# Patient Record
Sex: FEMALE | Race: WHITE
[De-identification: names, ages, dates, MRNs, and addresses within clinical notes are randomized per-mention and may not be internally consistent; named-entity substitution may affect disease eponyms.]

---

## 2018-07-08 ENCOUNTER — HOSPITAL ENCOUNTER (EMERGENCY)
Dept: HOSPITAL 1 - ED | Age: 28
Discharge: HOME | End: 2018-07-08
Payer: COMMERCIAL

## 2018-07-08 VITALS — BODY MASS INDEX: 28.27 KG/M2 | WEIGHT: 143.98 LBS | HEIGHT: 60 IN

## 2018-07-08 VITALS — SYSTOLIC BLOOD PRESSURE: 98 MMHG | DIASTOLIC BLOOD PRESSURE: 60 MMHG

## 2018-07-08 DIAGNOSIS — F17.210: ICD-10-CM

## 2018-07-08 DIAGNOSIS — E10.65: ICD-10-CM

## 2018-07-08 DIAGNOSIS — M79.7: ICD-10-CM

## 2018-07-08 DIAGNOSIS — O20.0: Primary | ICD-10-CM

## 2018-07-08 DIAGNOSIS — Z71.6: ICD-10-CM

## 2018-07-08 LAB
ALBUMIN SERPL-MCNC: 3.4 G/DL (ref 3.4–5)
ALP SERPL-CCNC: 89 U/L (ref 46–116)
ALT SERPL-CCNC: 16 U/L (ref 14–59)
AMYLASE SERPL-CCNC: 39 U/L (ref 25–115)
AST SERPL-CCNC: 9 U/L (ref 15–37)
BASOPHILS NFR BLD: 0.3 % (ref 0–2)
BILIRUB SERPL-MCNC: 0.31 MG/DL (ref 0.2–1)
BUN SERPL-MCNC: 13 MG/DL (ref 7–18)
CALCIUM SERPL-MCNC: 8.6 MG/DL (ref 8.5–10.1)
CHLORIDE SERPL-SCNC: 101 MMOL/L (ref 98–107)
CO2 SERPL-SCNC: 25.5 MMOL/L (ref 21–32)
CREAT SERPL-MCNC: 0.7 MG/DL (ref 0.6–1)
ERYTHROCYTE [DISTWIDTH] IN BLOOD BY AUTOMATED COUNT: 16.5 % (ref 11.5–14.5)
GFR SERPLBLD BASED ON 1.73 SQ M-ARVRAT: > 60 ML/MIN
GLUCOSE SERPL-MCNC: 296 MG/DL (ref 74–106)
LIPASE SERPL-CCNC: 84 IU/L (ref 73–393)
MICROSCOPIC UR-IMP: YES
PLATELET # BLD: 161 X10^3MCL (ref 130–400)
POTASSIUM SERPL-SCNC: 3.6 MMOL/L (ref 3.5–5.1)
PROT SERPL-MCNC: 7 G/DL (ref 6.4–8.2)
RBC # UR STRIP.AUTO: (no result) /UL
SODIUM SERPL-SCNC: 138 MMOL/L (ref 136–145)
UA SPECIFIC GRAVITY: 1.02 (ref 1–1.03)

## 2021-05-13 ENCOUNTER — HOSPITAL ENCOUNTER (INPATIENT)
Dept: HOSPITAL 26 - MED | Age: 31
LOS: 1 days | Discharge: HOME | DRG: 637 | End: 2021-05-14
Attending: STUDENT IN AN ORGANIZED HEALTH CARE EDUCATION/TRAINING PROGRAM | Admitting: STUDENT IN AN ORGANIZED HEALTH CARE EDUCATION/TRAINING PROGRAM
Payer: SELF-PAY

## 2021-05-13 VITALS — BODY MASS INDEX: 26.58 KG/M2 | WEIGHT: 135.37 LBS | HEIGHT: 60 IN

## 2021-05-13 VITALS — SYSTOLIC BLOOD PRESSURE: 92 MMHG | DIASTOLIC BLOOD PRESSURE: 52 MMHG

## 2021-05-13 VITALS — DIASTOLIC BLOOD PRESSURE: 84 MMHG | SYSTOLIC BLOOD PRESSURE: 121 MMHG

## 2021-05-13 DIAGNOSIS — Z91.013: ICD-10-CM

## 2021-05-13 DIAGNOSIS — E87.6: ICD-10-CM

## 2021-05-13 DIAGNOSIS — R11.10: ICD-10-CM

## 2021-05-13 DIAGNOSIS — Z79.4: ICD-10-CM

## 2021-05-13 DIAGNOSIS — Z88.5: ICD-10-CM

## 2021-05-13 DIAGNOSIS — G93.41: ICD-10-CM

## 2021-05-13 DIAGNOSIS — E10.65: Primary | ICD-10-CM

## 2021-05-13 DIAGNOSIS — F12.90: ICD-10-CM

## 2021-05-13 DIAGNOSIS — M79.7: ICD-10-CM

## 2021-05-13 DIAGNOSIS — R65.10: ICD-10-CM

## 2021-05-13 DIAGNOSIS — E87.1: ICD-10-CM

## 2021-05-13 DIAGNOSIS — Z20.822: ICD-10-CM

## 2021-05-13 LAB
ALBUMIN FLD-MCNC: 3.2 G/DL (ref 3.4–5)
ALBUMIN FLD-MCNC: 4.3 G/DL (ref 3.4–5)
AMYLASE SERPL-CCNC: 35 U/L (ref 25–115)
ANION GAP SERPL CALCULATED.3IONS-SCNC: 11.3 MMOL/L (ref 8–16)
ANION GAP SERPL CALCULATED.3IONS-SCNC: 12.7 MMOL/L (ref 8–16)
ANION GAP SERPL CALCULATED.3IONS-SCNC: 14.3 MMOL/L (ref 8–16)
APPEARANCE UR: (no result)
AST SERPL-CCNC: 10 U/L (ref 15–37)
AST SERPL-CCNC: 15 U/L (ref 15–37)
BARBITURATES UR QL SCN: NEGATIVE NG/ML
BASOPHILS # BLD AUTO: 0.1 K/UL (ref 0–0.22)
BASOPHILS NFR BLD AUTO: 0.5 % (ref 0–2)
BENZODIAZ UR QL SCN: NEGATIVE NG/ML
BILIRUB DIRECT SERPL-MCNC: 0.2 MG/DL (ref 0–0.3)
BILIRUB SERPL-MCNC: 0.4 MG/DL (ref 0–1)
BILIRUB SERPL-MCNC: 1.1 MG/DL (ref 0–1)
BILIRUB UR QL STRIP: NEGATIVE
BUN SERPL-MCNC: 14 MG/DL (ref 7–18)
BUN SERPL-MCNC: 15 MG/DL (ref 7–18)
BUN SERPL-MCNC: 17 MG/DL (ref 7–18)
BZE UR QL SCN: NEGATIVE NG/ML
CANNABINOIDS UR QL SCN: POSITIVE NG/ML
CHLORIDE SERPL-SCNC: 108 MMOL/L (ref 98–107)
CHLORIDE SERPL-SCNC: 110 MMOL/L (ref 98–107)
CHLORIDE SERPL-SCNC: 98 MMOL/L (ref 98–107)
CHOLEST/HDLC SERPL: 3.2 {RATIO} (ref 1–4.5)
CO2 SERPL-SCNC: 24.2 MMOL/L (ref 21–32)
CO2 SERPL-SCNC: 26.2 MMOL/L (ref 21–32)
CO2 SERPL-SCNC: 26.5 MMOL/L (ref 21–32)
COLOR UR: YELLOW
CREAT SERPL-MCNC: 0.7 MG/DL (ref 0.6–1.3)
CREAT SERPL-MCNC: 0.7 MG/DL (ref 0.6–1.3)
CREAT SERPL-MCNC: 1 MG/DL (ref 0.6–1.3)
EOSINOPHIL # BLD AUTO: 0 K/UL (ref 0–0.4)
EOSINOPHIL NFR BLD AUTO: 0.1 % (ref 0–4)
ERYTHROCYTE [DISTWIDTH] IN BLOOD BY AUTOMATED COUNT: 12.7 % (ref 11.6–13.7)
GFR SERPL CREATININE-BSD FRML MDRD: 127 ML/MIN (ref 90–?)
GFR SERPL CREATININE-BSD FRML MDRD: 127 ML/MIN (ref 90–?)
GFR SERPL CREATININE-BSD FRML MDRD: 84 ML/MIN (ref 90–?)
GLUCOSE SERPL-MCNC: 181 MG/DL (ref 74–106)
GLUCOSE SERPL-MCNC: 254 MG/DL (ref 74–106)
GLUCOSE SERPL-MCNC: 411 MG/DL (ref 74–106)
GLUCOSE UR STRIP-MCNC: (no result) MG/DL
HCT VFR BLD AUTO: 40.2 % (ref 36–48)
HDLC SERPL-MCNC: 44 MG/DL (ref 40–60)
HGB BLD-MCNC: 13.7 G/DL (ref 12–16)
HGB UR QL STRIP: NEGATIVE
LDLC SERPL CALC-MCNC: 79 MG/DL (ref 60–100)
LEUKOCYTE ESTERASE UR QL STRIP: NEGATIVE
LIPASE SERPL-CCNC: 39 U/L (ref 73–393)
LIPASE SERPL-CCNC: 68 U/L (ref 73–393)
LYMPHOCYTES # BLD AUTO: 1.8 K/UL (ref 2.5–16.5)
LYMPHOCYTES NFR BLD AUTO: 13.1 % (ref 20.5–51.1)
MAGNESIUM SERPL-MCNC: 1.6 MG/DL (ref 1.8–2.4)
MCH RBC QN AUTO: 29 PG (ref 27–31)
MCHC RBC AUTO-ENTMCNC: 34 G/DL (ref 33–37)
MCV RBC AUTO: 85 FL (ref 80–94)
MONOCYTES # BLD AUTO: 0.5 K/UL (ref 0.8–1)
MONOCYTES NFR BLD AUTO: 3.4 % (ref 1.7–9.3)
NEUTROPHILS # BLD AUTO: 11.1 K/UL (ref 1.8–7.7)
NEUTROPHILS NFR BLD AUTO: 82.9 % (ref 42.2–75.2)
NITRITE UR QL STRIP: NEGATIVE
OPIATES UR QL SCN: POSITIVE NG/ML
PCP UR QL SCN: NEGATIVE NG/ML
PH UR STRIP: 7 [PH] (ref 5–9)
PHOSPHATE SERPL-MCNC: 2.3 MG/DL (ref 2.5–4.9)
PLATELET # BLD AUTO: 237 K/UL (ref 140–450)
POTASSIUM SERPL-SCNC: 3.2 MMOL/L (ref 3.5–5.1)
POTASSIUM SERPL-SCNC: 3.5 MMOL/L (ref 3.5–5.1)
POTASSIUM SERPL-SCNC: 3.5 MMOL/L (ref 3.5–5.1)
PROTHROMBIN TIME: 10.6 SECS (ref 10.8–13.4)
RBC # BLD AUTO: 4.73 MIL/UL (ref 4.2–5.4)
RBC #/AREA URNS HPF: (no result) /HPF (ref 0–5)
SODIUM SERPL-SCNC: 134 MMOL/L (ref 136–145)
SODIUM SERPL-SCNC: 143 MMOL/L (ref 136–145)
SODIUM SERPL-SCNC: 144 MMOL/L (ref 136–145)
T4 FREE SERPL-MCNC: 1.33 NG/DL (ref 0.76–1.46)
T4 FREE SERPL-MCNC: 1.57 NG/DL (ref 0.76–1.46)
TRIGL SERPL-MCNC: 81 MG/DL (ref 30–150)
TSH SERPL DL<=0.05 MIU/L-ACNC: 1.46 UIU/ML (ref 0.34–3.74)
WBC # BLD AUTO: 13.4 K/UL (ref 4.8–10.8)
WBC,URINE: (no result) /HPF (ref 0–5)

## 2021-05-13 PROCEDURE — U0003 INFECTIOUS AGENT DETECTION BY NUCLEIC ACID (DNA OR RNA); SEVERE ACUTE RESPIRATORY SYNDROME CORONAVIRUS 2 (SARS-COV-2) (CORONAVIRUS DISEASE [COVID-19]), AMPLIFIED PROBE TECHNIQUE, MAKING USE OF HIGH THROUGHPUT TECHNOLOGIES AS DESCRIBED BY CMS-2020-01-R: HCPCS

## 2021-05-13 PROCEDURE — C9113 INJ PANTOPRAZOLE SODIUM, VIA: HCPCS

## 2021-05-13 PROCEDURE — 05HY33Z INSERTION OF INFUSION DEVICE INTO UPPER VEIN, PERCUTANEOUS APPROACH: ICD-10-PCS

## 2021-05-13 PROCEDURE — B54MZZA ULTRASONOGRAPHY OF RIGHT UPPER EXTREMITY VEINS, GUIDANCE: ICD-10-PCS

## 2021-05-13 RX ADMIN — MORPHINE SULFATE PRN MG: 2 INJECTION, SOLUTION INTRAMUSCULAR; INTRAVENOUS at 17:42

## 2021-05-13 RX ADMIN — SODIUM CHLORIDE SCH MLS/HR: 9 INJECTION, SOLUTION INTRAVENOUS at 23:15

## 2021-05-13 RX ADMIN — MORPHINE SULFATE PRN MG: 2 INJECTION, SOLUTION INTRAMUSCULAR; INTRAVENOUS at 14:32

## 2021-05-13 RX ADMIN — PANTOPRAZOLE SODIUM SCH MG: 40 INJECTION, POWDER, FOR SOLUTION INTRAVENOUS at 14:32

## 2021-05-13 RX ADMIN — SODIUM CHLORIDE SCH MLS/HR: 9 INJECTION, SOLUTION INTRAVENOUS at 13:27

## 2021-05-13 RX ADMIN — Medication SCH DEV: at 20:55

## 2021-05-13 RX ADMIN — Medication SCH DEV: at 16:30

## 2021-05-13 RX ADMIN — MORPHINE SULFATE PRN MG: 2 INJECTION, SOLUTION INTRAMUSCULAR; INTRAVENOUS at 21:47

## 2021-05-13 NOTE — NUR
unable to obtain EKG - pt is unable to lay still. Medication order to help w/ 
pain has been placed. EKG will be administered after pain medication. DESHAWND made 
aware.

## 2021-05-13 NOTE — NUR
PATIENT COMPLAINS ABDOMEN ACHING PAIN 8/10. PATIENT ALSO REQUESTS BLOOD SUGAR CHECK DUE TO 
PATIENT "FEEL SHAKING". PATIENT IS GRIMING, IRRITATE. VS TAKEN, BP 95/61, , RR 20, 
BLOOD SUGAR CHECK 82. PROVIDED 2 BOTTLES OF APPLE JUICE. MEDICATE PRN MEDICATION AS ORDER 
WITH EDUCATION, PATIENT VERBALIZED UNDERSTANDING. PATIENT TOLERATED WELL. NO SIGN OF 
RESPIRATORY DISTRESS NOTED. PRECAUTION IN PLACE. CALL LIGHT WITHIN REACH. WILL CONTINUE TO 
MONITOR.

## 2021-05-13 NOTE — NUR
AccuChek 218. Patient presents with blood pressure 86/45; patient states "my 
blood pressure is normally low, in the 90/50s is normal for me."

## 2021-05-13 NOTE — NUR
PAIN REASSESSMENT. PATIENT IS SLEEPING, NO SIGN OF DISTRESS NOTED, O2 SAT 92% ON ROOM AIR. 
CALL LIGHT WITHIN REACH. PRECAUTION IN PLACE. WILL CONTINUE TO MONITOR.

## 2021-05-13 NOTE — NUR
Patient presents sleeping on right side. Awaken for pain assessment; reports 
7/10. Respirations even/unlabored. Denies any nausea. Cardiac monitor remains 
in place. Bed locked in lowest position, side rails x 1, call light in reach.

## 2021-05-13 NOTE — NUR
COLLECTED L/R NARES SPECIMEN FOR MRSA, COLLECTED L/R SPECIMEN FOR NOVEL SARS PCR. PT 
TOLERATED PROCEDURE WELL.

## 2021-05-13 NOTE — NUR
Patient returned from CT via gurney, placed back onto IVF. Cardiac monitor 
remains in place. Bed locked in lowest position, side rails x 1, call light in 
reach.

## 2021-05-13 NOTE — NUR
Patient will be admitted to care of Dr. Reyes. Admited to Telemetry.  Will go 
to room 109-B. Belongings list completed.  Report to ALEX Barcenas

## 2021-05-13 NOTE — NUR
Patient presents sleeping on right side, 2 blankets provided. Respirations 
even/unlabored. Patient reports postive relief after medication; denies any 
nausea. Reports pain 7/10. Cardiac monitor remains in place. Bed locked in 
lowest position, side rails x 1, call light in reach.

## 2021-05-13 NOTE — NUR
RECEIVING PATIENT FROM AM NURSE FOR CONTINUITY OF CARE. PATIENT IS SLEEPING, AROUSABLE TO 
VOICE. ON TELE MONITOR. A/A/O X4. RESPIRATORY EVEN AND UNLABORED, ON ROOM AIR, O2 SAT 97%, 
NO SIGN OF RESPIRATORY DISTRESS NOTED. BOWEL SOUND ACTIVE, ABDOMEN SOFT, NON-DISTENDED. SKIN 
WARM, DRY, NON-DIAPHORETIC. IV ON LEFT UPPER ARM 20G, IS INFUSING FLUID, PATENT AND INTACT. 
IV ON LEFT WRIST 24G, SALINE LOCK, PATENT AND INTACT. PATIENT DENIES ANY PAIN OR DISCOMFORT 
AT THIS TIME. ABLE TO MAKE NEEDS KNOWN. PLAN OF CARE DISCUSSED, PATIENT VERBALIZED 
UNDERSTANDING. PRECAUTION IN PLACE. CALL LIGHT WITHIN REACH. WILL CONTINUE TO MONITOR.

## 2021-05-13 NOTE — NUR
ADMINISTERED MEDICATIONS PER MD ORDER, ADMINISTERED ANALGESIC PRN. MOA AND SIDE EFFECTS 
DISCUSSED WITH PT WHO VERBALIZED UNDERSTANDING. WILL CONTINUE TO MONITOR

## 2021-05-13 NOTE — NUR
Patient asleep laying on left side. Respiration even/unlabored. Cardica monitor 
remains in place. Bed locked in lowest position, side rails x 1, call light in 
reach.

## 2021-05-13 NOTE — NUR
REC'D PT FROM  ED. A/Ox4, RA. LUNGS CLEAR, ABD SOFT NONTENDER, S1S2,GUARDING. NO EDEMA 
PRESENT ON BILATERAL UPPER AND LOWER EXTREMITIES. PT ON TELEMONITOR, TACHYCARDIC. FACIAL 
GRIMACING. AMBULATORY. SKIN NORMAL FOR ETHNICITY PT CAN MAKE NEEDS KNOWN. CALL LIGHT WITHIN 
REACH. ALL SAFETY MEASURES IN PLACE.

## 2021-05-13 NOTE — NUR
AccuChek 256. Dr. Richards made aware. Cardiac monitor remains in place. Patient 
sleeping on left side. Respirations even/unlabored. Bed locked in lowest 
position, side rails x 1, call light in reach.

## 2021-05-13 NOTE — NUR
Patient presents sleeping on right side. Respirations even/unlabored. Denies 
any nausea. Cardiac monitor remains in place. Bed locked in lowest position, 
side rails x 1, call light in reach.

## 2021-05-13 NOTE — NUR
31 YO/F W C/O OF SHARP ABDOMINAL PAIN LEVEL 10 OUT OF 10 X2 DAYS. ABDOMEN 
TENDER TO TOUCH. PATIENT REPORTS SHE HAS BEEN VOMITING X2 DAYS, NO BLOOD 
PRESENT. PATIENT ALSO CO BODY ACHES X2 DAYS, DENIES FEVER. PATIENT STATES SHE 
IS IN DKA BECAUSE SHE HAD DKA IN JANUARY AND HAD SAME SYMPTOMS. PATIENT REPORTS 
LABORED BREATHING. LUNG SOUNDS CLEAR BIATERALLY, BREATHING EVEN AND UNLABORED 
AT THIS TIME. S1 S2 PRESENT. PATIENT LAYING IN BED, GAEL IN LOWEST POSITION, 
X1 SIDERAIL UP.



PMH: DIABETES, FIBROMYALGIA

PATIENT DENIES HAVING ANY ALLERGIES.

## 2021-05-14 VITALS — DIASTOLIC BLOOD PRESSURE: 55 MMHG | SYSTOLIC BLOOD PRESSURE: 90 MMHG

## 2021-05-14 VITALS — DIASTOLIC BLOOD PRESSURE: 52 MMHG | SYSTOLIC BLOOD PRESSURE: 91 MMHG

## 2021-05-14 VITALS — DIASTOLIC BLOOD PRESSURE: 54 MMHG | SYSTOLIC BLOOD PRESSURE: 101 MMHG

## 2021-05-14 VITALS — DIASTOLIC BLOOD PRESSURE: 60 MMHG | SYSTOLIC BLOOD PRESSURE: 110 MMHG

## 2021-05-14 LAB
ANION GAP SERPL CALCULATED.3IONS-SCNC: 12.2 MMOL/L (ref 8–16)
BUN SERPL-MCNC: 11 MG/DL (ref 7–18)
CHLORIDE SERPL-SCNC: 111 MMOL/L (ref 98–107)
CO2 SERPL-SCNC: 24.1 MMOL/L (ref 21–32)
CREAT SERPL-MCNC: 0.6 MG/DL (ref 0.6–1.3)
EOSINOPHIL NFR BLD MANUAL: 2 % (ref 0–4)
ERYTHROCYTE [DISTWIDTH] IN BLOOD BY AUTOMATED COUNT: 12.6 % (ref 11.6–13.7)
GFR SERPL CREATININE-BSD FRML MDRD: 152 ML/MIN (ref 90–?)
GLUCOSE SERPL-MCNC: 89 MG/DL (ref 74–106)
HCT VFR BLD AUTO: 31.4 % (ref 36–48)
HGB BLD-MCNC: 10.7 G/DL (ref 12–16)
LYMPHOCYTES NFR BLD MANUAL: 58 % (ref 20–46)
MAGNESIUM SERPL-MCNC: 1.7 MG/DL (ref 1.8–2.4)
MCH RBC QN AUTO: 30 PG (ref 27–31)
MCHC RBC AUTO-ENTMCNC: 34 G/DL (ref 33–37)
MCV RBC AUTO: 87.2 FL (ref 80–94)
MONOCYTES NFR BLD MANUAL: 3 % (ref 5–12)
PHOSPHATE SERPL-MCNC: 2.2 MG/DL (ref 2.5–4.9)
PLATELET # BLD AUTO: 206 K/UL (ref 140–450)
POTASSIUM SERPL-SCNC: 3.3 MMOL/L (ref 3.5–5.1)
RBC # BLD AUTO: 3.6 MIL/UL (ref 4.2–5.4)
SODIUM SERPL-SCNC: 144 MMOL/L (ref 136–145)
WBC # BLD AUTO: 8.6 K/UL (ref 4.8–10.8)

## 2021-05-14 RX ADMIN — PANTOPRAZOLE SODIUM SCH MG: 40 INJECTION, POWDER, FOR SOLUTION INTRAVENOUS at 10:17

## 2021-05-14 RX ADMIN — MORPHINE SULFATE PRN MG: 2 INJECTION, SOLUTION INTRAMUSCULAR; INTRAVENOUS at 02:38

## 2021-05-14 RX ADMIN — Medication SCH DEV: at 06:43

## 2021-05-14 RX ADMIN — Medication SCH DEV: at 11:21

## 2021-05-14 RX ADMIN — MORPHINE SULFATE PRN MG: 2 INJECTION, SOLUTION INTRAMUSCULAR; INTRAVENOUS at 10:16

## 2021-05-14 RX ADMIN — SODIUM CHLORIDE SCH MLS/HR: 9 INJECTION, SOLUTION INTRAVENOUS at 10:17

## 2021-05-14 NOTE — NUR
PATIENT COMPLAINS ABDOMEN PAIN 8/10, PAIN MEDICATION PRN GIVEN WITH EDUCATION, PATIENT 
VERBALIZED UNDERSTANDING. BP 96/61, , RR 20. NO SIGN OF RESPIRATORY DISTRESS NOTED. 
CALL LIGHT WITHIN REACH. WILL CONTINUE TO MONITOR.

## 2021-05-14 NOTE — NUR
DISCUSSED DISCHARGE FORMS WITH PATIENT. PATIENT VERBALIZED UNDERSTANDING AND SIGNED FORMS. 
AWAITING FOR  TO PICK PATIENT UP AT 1500

## 2021-05-14 NOTE — NUR
COVID TEST WAS NEGATIVE. PATIENT WILL BE DISCHARGE. CONTACTED  TO NOTIFY THEM THAT 
PATIENT WILL BE DISCHARGED. PATIENT IS AWARE OF THE RESULT AND THE DISCHARGE ORDER.

## 2021-05-14 NOTE — NUR
ROUND CHECK. PATIENT IS SLEEPING, NO SIGN OF RESPIRATORY DISTRESS NOTED, O2 SAT 97%. CALL 
LIGHT WITHIN REACH. PRECAUTION IN PLACE. WILL CONTINUE TO MONITOR.

## 2021-05-14 NOTE — NUR
PATIENT HAS BEEN SCREENED AND CATEGORIZED AS MODERATE NUTRITION RISK. PATIENT WILL BE SEEN 
WITHIN 3-5 DAYS OF ADMISSION.



05/16/21 05/18/21



[facility] REFERRAL FOR NAUSEA AND VOMITING >3 DAYS NOT ACCURATE, PT HAS HAD VOMITING X 1 DAY PER 
H&P NOTE.



MARTIN POWE[address] RD

## 2021-05-14 NOTE — NUR
NEW DISCHARGE ORDERS RECEIVED. PATIENT WILL BE DISCHARGE AS SOON AS THE COVID RAPID TEST IS 
NEGATIVE.

## 2021-05-14 NOTE — NUR
BLOOD SUGAR CHECK 70. NO INSULIN COVER NEEDS. APPLE JUICE X2 GIVEN. PATIENT TOLERATED WELL. 
NO SIGN OF RESPIRATORY DISTRESS NOTED. PRECAUTION IN PLACE. CALL LIGHT WITHIN REACH. WILL 
CONTINUE TO MONITOR.

## 2021-05-14 NOTE — NUR
ROUND CHECK. PATIENT IS SLEEPING ON HER RIGHT SIDE, CHEST RISE AND FALL NOTED, O2 SAT 95%. 
NO SIGN OF DISTRESS NOTED. CALL LIGHT WITHIN REACH. PRECAUTION IN PLACE. WILL CONTINUE TO 
MONITOR.

## 2021-05-14 NOTE — NUR
RECEIVED BEDSIDE REPORT FROM NIGHT SHIFT NURSE FOR CONTINUITY OF CARE. PATIENT IS SLEEPING, 
AROUSABLE TO VOICE. ON TELE MONITOR. RESPIRATIONS EVEN AND UNLABORED, ON ROOM AIR, O2 SAT 
98%, NO S/S OF RESPIRATORY DISTRESS NOTED.  SKIN WARM AND DRY. IV ON LEFT UPPER ARM 20G, 
INFUSING FLUID WELL AND IV ON LEFT WRIST 24G, SALINE LOCK, PATENT AND INTACT. PATIENT DENIES 
ANY PAIN OR DISCOMFORT AT THIS TIME. PLAN OF CARE DISCUSSED, SAFETY PRECAUTIONS IN PLACE. 
CALL LIGHT WITHIN REACH. WILL CONTINUE TO MONITOR.

## 2021-05-15 ENCOUNTER — HOSPITAL ENCOUNTER (EMERGENCY)
Dept: HOSPITAL 26 - MED | Age: 31
Discharge: HOME | End: 2021-05-15
Payer: SELF-PAY

## 2021-05-15 VITALS — WEIGHT: 136 LBS | BODY MASS INDEX: 26.7 KG/M2 | HEIGHT: 60 IN

## 2021-05-15 VITALS — SYSTOLIC BLOOD PRESSURE: 155 MMHG | DIASTOLIC BLOOD PRESSURE: 108 MMHG

## 2021-05-15 DIAGNOSIS — F17.210: ICD-10-CM

## 2021-05-15 DIAGNOSIS — Z88.8: ICD-10-CM

## 2021-05-15 DIAGNOSIS — R11.2: ICD-10-CM

## 2021-05-15 DIAGNOSIS — R10.9: Primary | ICD-10-CM

## 2021-05-15 DIAGNOSIS — R42: ICD-10-CM

## 2021-05-15 DIAGNOSIS — F12.10: ICD-10-CM

## 2021-05-15 DIAGNOSIS — Z91.013: ICD-10-CM

## 2021-05-15 DIAGNOSIS — E11.9: ICD-10-CM

## 2021-05-15 LAB
ALBUMIN FLD-MCNC: 4.2 G/DL (ref 3.4–5)
ANION GAP SERPL CALCULATED.3IONS-SCNC: 13.8 MMOL/L (ref 8–16)
AST SERPL-CCNC: 13 U/L (ref 15–37)
BASOPHILS # BLD AUTO: 0.2 K/UL (ref 0–0.22)
BASOPHILS NFR BLD AUTO: 1.9 % (ref 0–2)
BILIRUB SERPL-MCNC: 0.4 MG/DL (ref 0–1)
BUN SERPL-MCNC: 7 MG/DL (ref 7–18)
CHLORIDE SERPL-SCNC: 103 MMOL/L (ref 98–107)
CO2 SERPL-SCNC: 25.1 MMOL/L (ref 21–32)
CREAT SERPL-MCNC: 0.8 MG/DL (ref 0.6–1.3)
EOSINOPHIL # BLD AUTO: 0.8 K/UL (ref 0–0.4)
EOSINOPHIL NFR BLD AUTO: 8 % (ref 0–4)
ERYTHROCYTE [DISTWIDTH] IN BLOOD BY AUTOMATED COUNT: 12.3 % (ref 11.6–13.7)
GFR SERPL CREATININE-BSD FRML MDRD: 109 ML/MIN (ref 90–?)
GLUCOSE SERPL-MCNC: 255 MG/DL (ref 74–106)
HCT VFR BLD AUTO: 39.8 % (ref 36–48)
HGB BLD-MCNC: 13.5 G/DL (ref 12–16)
LIPASE SERPL-CCNC: 73 U/L (ref 73–393)
LYMPHOCYTES # BLD AUTO: 2.3 K/UL (ref 2.5–16.5)
LYMPHOCYTES NFR BLD AUTO: 22.8 % (ref 20.5–51.1)
MCH RBC QN AUTO: 29 PG (ref 27–31)
MCHC RBC AUTO-ENTMCNC: 34 G/DL (ref 33–37)
MCV RBC AUTO: 84.9 FL (ref 80–94)
MONOCYTES # BLD AUTO: 0.3 K/UL (ref 0.8–1)
MONOCYTES NFR BLD AUTO: 3 % (ref 1.7–9.3)
NEUTROPHILS # BLD AUTO: 6.5 K/UL (ref 1.8–7.7)
NEUTROPHILS NFR BLD AUTO: 64.3 % (ref 42.2–75.2)
PLATELET # BLD AUTO: 238 K/UL (ref 140–450)
POTASSIUM SERPL-SCNC: 3.9 MMOL/L (ref 3.5–5.1)
RBC # BLD AUTO: 4.68 MIL/UL (ref 4.2–5.4)
SODIUM SERPL-SCNC: 138 MMOL/L (ref 136–145)
WBC # BLD AUTO: 10.1 K/UL (ref 4.8–10.8)

## 2021-05-15 PROCEDURE — 82803 BLOOD GASES ANY COMBINATION: CPT

## 2021-05-15 PROCEDURE — 80053 COMPREHEN METABOLIC PANEL: CPT

## 2021-05-15 PROCEDURE — 96361 HYDRATE IV INFUSION ADD-ON: CPT

## 2021-05-15 PROCEDURE — 83690 ASSAY OF LIPASE: CPT

## 2021-05-15 PROCEDURE — 85025 COMPLETE CBC W/AUTO DIFF WBC: CPT

## 2021-05-15 PROCEDURE — 96374 THER/PROPH/DIAG INJ IV PUSH: CPT

## 2021-05-15 PROCEDURE — 96375 TX/PRO/DX INJ NEW DRUG ADDON: CPT

## 2021-05-15 PROCEDURE — 36415 COLL VENOUS BLD VENIPUNCTURE: CPT

## 2021-05-15 PROCEDURE — 99284 EMERGENCY DEPT VISIT MOD MDM: CPT

## 2021-05-15 NOTE — NUR
Patient discharged with v/s stable. Written and verbal after care instructions 
given and explained. 

Patient alert, oriented and verbalized understanding of instructions. 
Ambulatory with steady gait. All questions addressed prior to discharge. ID 
band removed. Patient advised to follow up with PMD. Rx of TYNENOL 500MG PO 
Q4-6H PRN PAIN, AND ZOFRAN PO 4MG Q8H PRN N/V given. Patient educated on 
indication of medication including possible reaction and side effects. 
Opportunity to ask questions provided and answered.

## 2021-05-15 NOTE — NUR
31 Y/O FEMALE C/O EPIGASTRIC PAIN 10/10 DESCRIBES AS ACHING WITH +N/+V X1DAY 
AND HEADACHE X 3 DAYS. PT STATES SHE WAS DISCAHARGE FROM HOSPITAL YESTERDAY. 
ABDOMEN IS SOFT, ROUND, NON-TENDER TO PALPATION, BOWEL SOUNDS ACTIVE X4. LAST 
BM 5/13/21.



PMH: DM



ALLERGIES: IODINE, SHELLFISH
